# Patient Record
Sex: FEMALE | Race: WHITE
[De-identification: names, ages, dates, MRNs, and addresses within clinical notes are randomized per-mention and may not be internally consistent; named-entity substitution may affect disease eponyms.]

---

## 2017-10-03 NOTE — DIAGNOSTIC IMAGING REPORT
CT SCAN OF THE THORACIC SPINE



CLINICAL HISTORY: Chronic pain syndrome.



COMPARISON STUDY: No priors.



TECHNIQUE: CT scan of the thoracic spine is performed from the lower cervical

spine to the upper lumbar spine. Images are reviewed in the axial, sagittal, and

coronal planes. IV contrast was not administered for this examination. A dose

lowering technique was utilized adhering to the principles of ALARA.



CT DOSE: 496.69 mGycm



FINDINGS: The skeletal structures are osteopenic. Vertebral body height and

alignment are maintained throughout the thoracic spine. No lytic or blastic

lesion is seen. The transverse and spinous processes appear intact.

Postoperative change is partially visualized in the lower cervical region.

Intrathecal leads enter the central canal on the left at T1 to T2. These extend

in the cervical region. The leads are intact as visualized. The intervertebral

disc spaces are maintained. There is no evidence of large disc herniation or

central canal stenosis as seen by CT. There is no evidence of significant neural

foraminal stenosis. The paraspinous soft tissues are within normal limits. The

visualized posterior ribs appear intact. Emphysematous change is noted. No

airspace consolidation or pleural effusion is identified involving the partially

imaged lung parenchyma. Cortical scarring is noted in the upper pole of the left

kidney. There is nodularity of the left upper pole. A tiny hiatal hernia is

identified.



IMPRESSION:



1. No abnormality is seen involving the thoracic spine.



2. Postoperative change and intrathecal leads are noted in the lower cervical

region as detailed above.



3. Emphysema.



4. Cortical scarring is noted in the upper pole of the left kidney. There is

apparent nodularity involving the left upper pole which is likely related to

scarring. Correlation with renal ultrasound is recommended for further

interrogation.









Dictated:  10/3/2017 4:06 PM

Transcribed:  10/3/2017 5:17 PM

[facility]mini







Electronically signed by:  Boris Meyer M.D.

10/3/2017 5:26 PM



Dictated Date/Time:  10/3/2017 4:06 PM

## 2017-11-26 NOTE — EMERGENCY ROOM VISIT NOTE
History


Report prepared by Owen:  Janine Grady


Under the Supervision of:  Dr. Edmundo Manley M.D.


First contact with patient:  20:23


Chief Complaint:  INFECTION


Stated Complaint:  INFEC IN SURGERY ON BACK, INCISION SPLITTING OPEN





History of Present Illness


The patient is a 41 year old female who presents to the Emergency Room with 

complaints of a possible infection from her surgical site. The patient had a 

spinal pain stimulator surgically put in place by Dr. Garzon-Spinal Surgery 

at the [facility] on the 20th of October. The patient 

reports having 2 stitches pop out of the site and having it scab over. She had 

a follow up appointment with her surgeon on November 9th and was told 

everything was fine. The patient went to [place] a week ago and she showed the 

site to her daughter because it was the site was burning. At the time, her 

daughter thought the site was infected and put alcohol on it. The patient came 

home 3 days ago and has been putting peroxide and Neosporin on it since. She 

notes pain to the site but denies any fevers, chills, nausea or cough over the 

past week. Four days ago, the patient noticed the discharge and splitting of 

the site. The patient has chronic back pain.





   Source of History:  patient


   Position:  back


   Quality:  other (infection)


   Timing:  other (possible)


   Associated Symptoms:  + back pain, No fevers, No chills, No cough, No nausea





Review of Systems


See HPI for pertinent positives and negatives.  A total of ten systems were 

reviewed and were otherwise negative.





Past Medical & Surgical


Medical Problems:


(1) Brain cyst


(2) Chronic back pain








Family History





Patient reports no known family medical history.





Social History


Smoking Status:  Current Every Day Smoker


Marital Status:  


Housing Status:  lives with family


Occupation Status:  employed





Current/Historical Medications


Scheduled


Celecoxib (CeleBREX), 200 MG PO QPM


Clindamycin Hcl (Clindamycin Hcl), 4 CAP PO TID


Docusate Sodium (Colace), 1 CAP PO BID


Esomeprazole Magnesium (Nexium), 20 MG PO QPM


Gabapentin (Neurontin), 1 TAB PO TID


Metoprolol Succ (Toprol Xl) (Toprol-Xl), 25 MG PO QAM


Metoprolol Succ (Toprol Xl) (Toprol-Xl), 12.5 MG PO QPM


Morphine Cont Rel (Ms Contin), 15 MG PO Q12


Naproxen (Naprosyn), 500 MG PO BID


Saccharomyces Boulardii (Florastor), 1 CAP PO BID


Trazodone Hcl (Desyrel), 300 MG PO HS





Allergies


Coded Allergies:  


     No Known Allergies (Unverified , 12/2/16)





Physical Exam


Vital Signs











  Date Time  Temp Pulse Resp B/P (MAP) Pulse Ox O2 Delivery O2 Flow Rate FiO2


 


11/27/17 01:59  71 15 97/69 99   


 


11/27/17 01:56  65 15  99   


 


11/27/17 01:41  64 25  99   


 


11/27/17 01:36  61 18  98   


 


11/27/17 01:31    100/66    


 


11/27/17 01:21  58 13  99   


 


11/27/17 01:19    102/47    


 


11/27/17 00:36  67 19  99   


 


11/27/17 00:21  58 16  99   


 


11/27/17 00:06  63 17  99   


 


11/27/17 00:01    114/77    


 


11/26/17 23:51  63 25  98   


 


11/26/17 23:36  62 22  98   


 


11/26/17 23:31    106/69    


 


11/26/17 23:30  63 21  98   


 


11/26/17 23:15  64 17  97 Room Air  


 


11/26/17 23:14  64      


 


11/26/17 23:11    111/70    


 


11/26/17 23:05  68 20 111/78 99 Room Air  


 


11/26/17 21:45  67 20 100/76 99 Room Air  


 


11/26/17 20:07 36.9 79 18 143/99 99 Room Air  











Physical Exam


GENERAL: Awake, alert, well-appearing, in no distress


HENT: Normocephalic, atraumatic. Oropharynx unremarkable.


EYES: Normal conjunctiva. Sclera non-icteric.


NECK: Supple. No nuchal rigidity. FROM. No JVD.


RESPIRATORY: Clear to auscultation.


CARDIAC: Regular rate, normal rhythm. Extremities warm and well perfused. 

Pulses equal.


ABDOMEN: Soft, non-distended. No tenderness to palpation. No rebound or 

guarding. No masses.


RECTAL: Deferred.


BACK: 3.5 cm prior incision site for spinal surgical pain stimulator with 

dehisence with mild surrounding erythema, warm but no purulence or crepitus.


MUSCULOSKELETAL: Chest examination reveals no tenderness. The back is 

symmetrical on inspection without obvious abnormality. There is no CVA 

tenderness to palpation. No joint edema. 


LOWER EXTREMITIES: Calves are equal size bilaterally and non-tender. No edema. 

No discoloration. 


NEURO: Normal sensorium. No sensory or motor deficits noted. 


SKIN: No rash or jaundice noted.





Medical Decision & Procedures


ER Provider


Diagnostic Interpretation:


STATRAD: 


Preliminary Findings Only  See Final Report For Complete Findings 


CT ABDOMEN & PELVIS With Contrast:





Postsurgical changes associated with posterior implanted stimulators with mild 

subcutaneous stranding and air at midline adjacent to the leads and also more 

inferiorly at the lumbosacral junction.. Small 8 mm nodular density adjacent to 

the right-sided stimulator (image [id number]), unclear etiology. No evidence of 

drainable fluid collection.





Areas of wall thickening in the transverse, descending, and rectosigmoid colon, 

may be related to underdistention. Correlate clinically to exclude colitis.





Mild esophageal wall thickening.





No evidence of small bowel obstruction.





Likely chronic irregularity of the left kidney with renal pelviectasis. No 

evidence of hydroureter or ureteral stone.





Tiny fat-containing umbilical hernia.





Additional incidental findings.





Laboratory Results


11/26/17 20:45








Red Blood Count 4.83, Mean Corpuscular Volume 87.2, Mean Corpuscular Hemoglobin 

29.8, Mean Corpuscular Hemoglobin Concent 34.2, Mean Platelet Volume 10.5, 

Neutrophils (%) (Auto) 47.9, Lymphocytes (%) (Auto) 41.8, Monocytes (%) (Auto) 

6.4, Eosinophils (%) (Auto) 2.6, Basophils (%) (Auto) 1.0, Neutrophils # (Auto) 

5.57, Lymphocytes # (Auto) 4.86, Monocytes # (Auto) 0.75, Eosinophils # (Auto) 

0.30, Basophils # (Auto) 0.12





11/26/17 20:45

















Test


  11/26/17


20:45


 


White Blood Count


  11.63 K/uL


(4.8-10.8)


 


Red Blood Count


  4.83 M/uL


(4.2-5.4)


 


Hemoglobin


  14.4 g/dL


(12.0-16.0)


 


Hematocrit 42.1 % (37-47) 


 


Mean Corpuscular Volume


  87.2 fL


()


 


Mean Corpuscular Hemoglobin


  29.8 pg


(25-34)


 


Mean Corpuscular Hemoglobin


Concent 34.2 g/dl


(32-36)


 


Platelet Count


  338 K/uL


(130-400)


 


Mean Platelet Volume


  10.5 fL


(7.4-10.4)


 


Neutrophils (%) (Auto) 47.9 % 


 


Lymphocytes (%) (Auto) 41.8 % 


 


Monocytes (%) (Auto) 6.4 % 


 


Eosinophils (%) (Auto) 2.6 % 


 


Basophils (%) (Auto) 1.0 % 


 


Neutrophils # (Auto)


  5.57 K/uL


(1.4-6.5)


 


Lymphocytes # (Auto)


  4.86 K/uL


(1.2-3.4)


 


Monocytes # (Auto)


  0.75 K/uL


(0.11-0.59)


 


Eosinophils # (Auto)


  0.30 K/uL


(0-0.5)


 


Basophils # (Auto)


  0.12 K/uL


(0-0.2)


 


RDW Standard Deviation


  45.7 fL


(36.4-46.3)


 


RDW Coefficient of Variation


  14.3 %


(11.5-14.5)


 


Immature Granulocyte % (Auto) 0.3 % 


 


Immature Granulocyte # (Auto)


  0.03 K/uL


(0.00-0.02)


 


Anion Gap


  10.0 mmol/L


(3-11)


 


Est Creatinine Clear Calc


Drug Dose 62.2 ml/min 


 


 


Estimated GFR (


American) 85.0 


 


 


Estimated GFR (Non-


American 73.4 


 


 


BUN/Creatinine Ratio 12.3 (10-20) 


 


Calcium Level


  8.9 mg/dl


(8.5-10.1)


 


Total Bilirubin


  0.3 mg/dl


(0.2-1)


 


Direct Bilirubin


  0.1 mg/dl


(0-0.2)


 


Aspartate Amino Transf


(AST/SGOT) 30 U/L (15-37) 


 


 


Alanine Aminotransferase


(ALT/SGPT) 32 U/L (12-78) 


 


 


Alkaline Phosphatase


  70 U/L


()


 


Total Protein


  7.8 gm/dl


(6.4-8.2)


 


Albumin


  3.8 gm/dl


(3.4-5.0)


 


Lipase


  458 U/L


()





Laboratory results reviewed by me





Medications Administered











 Medications


  (Trade)  Dose


 Ordered  Sig/Tutu


 Route  Start Time


 Stop Time Status Last Admin


Dose Admin


 


 Sodium Chloride  1,000 ml @ 


 999 mls/hr  Q1H1M STAT


 IV  11/26/17 20:44


 11/26/17 21:44 DC 11/26/17 21:00


999 MLS/HR


 


 Morphine Sulfate


  (Oramorph Sr Tab)  15 mg  NOW  STAT


 PO  11/26/17 21:57


 11/26/17 21:59 DC 11/26/17 22:24


15 MG


 


 Nicotine


  (Nicoderm Cq


 21MG Patch)  1 patch  NOW  STAT


 TD  11/27/17 00:33


 11/27/17 00:34 DC 11/27/17 00:45


1 PATCH


 


 Clindamycin


 Phosphate 600 mg/


 Dextrose  54 ml @ 


 100 mls/hr  ONE  ONCE


 IV  11/27/17 01:15


 11/27/17 01:47 DC 11/27/17 01:19


100 MLS/HR


 


 Clindamycin HCl


  (Cleocin Cap)  600 mg  NOW  ONCE


 PO  11/27/17 01:30


 11/27/17 01:31 DC 11/27/17 01:35


600 MG











ED Course


2026: The patient was evaluated in room B10. A complete history and physical 

exam was performed.





Medical Decision


I reviewed the patient's past medical history, medications, and the nursing 

notes as described above. Differential diagnoses: cellulitis, deep tissue 

infection.





The patient is a 41-year-old woman with a past medical history of chronic pain 

and spine stimulator placed in beginning of October at [facility] Dr. Garzon, and 

surgeon presents with concern for infection at the placement of the battery 

pack where it appears the wound has dehisced and she had purulent drainage for 

the past several days per history of present illness.  Arrival the patient is 

well-appearing, in no distress, afebrile with stable vital signs.  Battery pack 

incision site does appearing dehisced with mild surrounding erythema and warmth 

with mild tenderness, no active purulent drainage.


WBC marginally elevated 11.65.  Labs otherwise unremarkable.  STATRAD read of 

CT scan demonstrates some stranding along the leads that are nonspecific and 

could be postoperative versus infection.  Patient's surgeon was paged however 

we did not receive a call back.  Given the patient appears clinically well, 

will give the patient an IV dose of antibiotics with subsequent close follow-up 

with her surgeon first thing tomorrow.  She is agreeable with this plan.  

Patient did not want to wait for long IV infusion of vancomycin therefore will 

with clindamycin for MRSA coverage and then DC with oral Rx. Findings and plan 

for follow-up reviewed with patient. Patient agreeable and d/c'd per discharge 

instructions.





Medication Reconcilliation


Current Medication List:  was personally reviewed by me





Impression





 Primary Impression:  


 Cellulitis


 Additional Impression:  


 Wound dehiscence





Scribe Attestation


The scribe's documentation has been prepared under my direction and personally 

reviewed by me in its entirety. I confirm that the note above accurately 

reflects all work, treatment, procedures, and medical decision making performed 

by me.





Departure Information


Dispostion


Home / Self-Care





Prescriptions





Saccharomyces Boulardii (Florastor) 250 Mg Cap


1 CAP PO BID for 10 Days, #20 CAP


   Prov: Edmundo Manley M.D.         11/27/17 


Clindamycin Hcl (CLINDAMYCIN HCL) 150 Mg Cap


4 CAP PO TID for 10 Days, #120 CAP


   Prov: Edmundo Manley M.D.         11/27/17





Referrals


No Doctor, Assigned (PCP)





Patient Instructions


ED Infec Skin Cellulitis, [facility]





Additional Instructions





Please follow up with your spine surgeon, Dr. Garzon, at [facility], tomorrow for re

-evaluation.





You likely have a skin infection associated with your recent stimulator 

placement and should be promptly evaluated by your surgeon tomorrow.


Otherwise, your exam, CT scan, and lab results did not show signs of an 

emergent condition at this time.





Clindamycin as directed.


Probiotic to help prevent antibiotic associated diarrhea.





Return to the emergency department for worsening symptoms as described in the 

accompanying instructions.





Problem Qualifiers

## 2017-11-27 NOTE — DIAGNOSTIC IMAGING REPORT
ABD/PELVIS IV CONTRAST ONLY



CT DOSE: 271.56 mGy.cm



HISTORY: Pain  spine stimulator infection / r/o deep infection.



TECHNIQUE: Multiaxial CT images of the abdomen and pelvis were performed

following the use of intravenous contrast.  A dose lowering technique was

utilized adhering to the principles of ALARA.





COMPARISON STUDY: None.



FINDINGS: Lung bases are clear. Liver spleen and pancreas are unremarkable.

Cortical scarring of both kidneys is present. No evidence for hydronephrosis

present. Extrarenal pelvis is noted on the left.



Postoperative changes consistent with recent bile stimulator placement are

present of the thoracolumbar spine. HISTORY: Is matter fat stranding surrounding

the leads as well as a trace postoperative air. No evidence for abnormal

collection is identified. There is no evidence for drainable abscess.



Bowel pattern is nonobstructive. Appendix is normal. There is no evidence for

intra-abdominal or intrapelvic drainable abscess or collection.



IMPRESSION: 

Bilateral spinal bile stimulators with expected postoperative soft tissue

change. No evidence for abscess or collection. No acute process of the abdomen

or pelvis. 







The above report was generated using voice recognition software.  It may contain

grammatical, syntax or spelling errors.







Electronically signed by:  Zachary Singh M.D.

11/27/2017 6:17 AM



Dictated Date/Time:  11/27/2017 6:14 AM

## 2018-08-13 ENCOUNTER — HOSPITAL ENCOUNTER (EMERGENCY)
Dept: HOSPITAL 45 - C.EDB | Age: 43
Discharge: HOME | End: 2018-08-13
Payer: OTHER GOVERNMENT

## 2018-08-13 VITALS — OXYGEN SATURATION: 98 % | SYSTOLIC BLOOD PRESSURE: 99 MMHG | DIASTOLIC BLOOD PRESSURE: 62 MMHG | HEART RATE: 60 BPM

## 2018-08-13 VITALS — OXYGEN SATURATION: 99 %

## 2018-08-13 VITALS
HEIGHT: 65 IN | BODY MASS INDEX: 22.08 KG/M2 | BODY MASS INDEX: 22.08 KG/M2 | WEIGHT: 132.5 LBS | WEIGHT: 132.5 LBS | HEIGHT: 65 IN

## 2018-08-13 VITALS — TEMPERATURE: 98.06 F

## 2018-08-13 DIAGNOSIS — B37.3: ICD-10-CM

## 2018-08-13 DIAGNOSIS — K20.9: Primary | ICD-10-CM

## 2018-08-13 DIAGNOSIS — R11.0: ICD-10-CM

## 2018-08-13 DIAGNOSIS — G89.29: ICD-10-CM

## 2018-08-13 DIAGNOSIS — Z79.899: ICD-10-CM

## 2018-08-13 DIAGNOSIS — F17.200: ICD-10-CM

## 2018-08-13 LAB
ALBUMIN SERPL-MCNC: 4.7 GM/DL (ref 3.4–5)
ALP SERPL-CCNC: 53 U/L (ref 45–117)
ALT SERPL-CCNC: 37 U/L (ref 12–78)
AST SERPL-CCNC: 33 U/L (ref 15–37)
BASOPHILS # BLD: 0.16 K/UL (ref 0–0.2)
BASOPHILS NFR BLD: 1.9 %
BUN SERPL-MCNC: 11 MG/DL (ref 7–18)
CALCIUM SERPL-MCNC: 9.6 MG/DL (ref 8.5–10.1)
CO2 SERPL-SCNC: 25 MMOL/L (ref 21–32)
CREAT SERPL-MCNC: 1.02 MG/DL (ref 0.6–1.2)
EOS ABS #: 0.08 K/UL (ref 0–0.5)
EOSINOPHIL NFR BLD AUTO: 353 K/UL (ref 130–400)
GLUCOSE SERPL-MCNC: 92 MG/DL (ref 70–99)
HCT VFR BLD CALC: 45.7 % (ref 37–47)
HGB BLD-MCNC: 15.9 G/DL (ref 12–16)
IG#: 0.01 K/UL (ref 0–0.02)
IMM GRANULOCYTES NFR BLD AUTO: 26.1 %
INR PPP: 1.1 (ref 0.9–1.1)
LIPASE: 589 U/L (ref 73–393)
LYMPHOCYTES # BLD: 2.2 K/UL (ref 1.2–3.4)
MCH RBC QN AUTO: 29.8 PG (ref 25–34)
MCHC RBC AUTO-ENTMCNC: 34.8 G/DL (ref 32–36)
MCV RBC AUTO: 85.6 FL (ref 80–100)
MONO ABS #: 0.35 K/UL (ref 0.11–0.59)
MONOCYTES NFR BLD: 4.1 %
NEUT ABS #: 5.64 K/UL (ref 1.4–6.5)
NEUTROPHILS # BLD AUTO: 0.9 %
NEUTROPHILS NFR BLD AUTO: 66.9 %
PMV BLD AUTO: 11 FL (ref 7.4–10.4)
POTASSIUM SERPL-SCNC: 4 MMOL/L (ref 3.5–5.1)
PROT SERPL-MCNC: 8.9 GM/DL (ref 6.4–8.2)
PTT PATIENT: 27.9 SECONDS (ref 21–31)
RED CELL DISTRIBUTION WIDTH CV: 12.9 % (ref 11.5–14.5)
RED CELL DISTRIBUTION WIDTH SD: 40.5 FL (ref 36.4–46.3)
SODIUM SERPL-SCNC: 136 MMOL/L (ref 136–145)
WBC # BLD AUTO: 8.44 K/UL (ref 4.8–10.8)

## 2018-08-13 NOTE — EMERGENCY ROOM VISIT NOTE
History


First contact with patient:  12:27


Chief Complaint:  ABDOMINAL PAIN


Stated Complaint:  BLEEDING STOMACH ULCERS


Nursing Triage Summary:  


Pt reports hx of stomach ulcers and reports having 4 episodes of emesis with 


small dark blood clots. Pt states that stomach ulcers have been flaring up for 


the past 3 months due to stress, and that her pcp has prescribed her nexium and 


to increase the dose as sx have gotten worse. Pt states that she is currently 


taking nexium three times a day. Pt states that her pcp told her to come to ED 


due to bloody emesis.





History of Present Illness


The patient is a 42 year old female who presents to the Emergency Room via 

private vehicle with complaints of "bleeding stomach ulcers".  The patient 

states that she has a history of bleeding ulcers, and notes that she had an EGD 

years ago which showed 12 ulcers in the stomach and numerous small ulcers.  She 

believes this was about 7-10 years ago.  She states that she has been doing 

okay but has been on Nexium 3 times a day and Zantac 3 times a day over the 

past month with minimal relief.  She notes that she has heartburn/burning after 

she eats, and for the past 4 days has had epigastric abdominal pain and has 

been vomiting which she believes is some dark red blood.  She notes this 

happened 4 times.  She denies any black tarry stools.  She denies having 

surgery on the ulcers.  She also notes that despite having a hysterectomy, she 

is concerned that the vaginal vault was not closed years ago, and that it is 

open into her abdomen and also notes vaginal discharge.  She rates her overall 

discomfort as an 8/10.  She notes a monogamous relationship, no concern over 

sexual transmitted infection or disease.  Never had discharge like this before.





Review of Systems


A complete 10-point Review of Systems was discussed with the patient, with 

pertinent positives and negatives listed in the History of Present Illness. All 

remaining Review of Systems questions can be considered negative unless 

otherwise specified.





Past Medical/Surgical History


Medical Problems:


(1) Brain cyst


(2) Chronic back pain








Family History





Patient reports no known family medical history.





Social History


Smoking Status:  Current Every Day Smoker


Marital Status:  


Housing Status:  lives with family





Current/Historical Medications


Scheduled


Docusate Sodium (Colace), 1 CAP PO BID


Esomeprazole Magnesium (Nexium), 40 MG PO DAILY


Fluconazole (Diflucan), 150 MG PO DAILY


Gabapentin (Neurontin), 300 MG PO TID


Metoprolol Succ (Toprol Xl) (Toprol-Xl), 25 MG PO BID


Morphine Cont Rel (Ms Contin), 15 MG PO Q12


Naproxen (Naprosyn), 500 MG PO BID


Sucralfate (Carafate), 1 TAB PO QID


Trazodone Hcl (Desyrel), 300 MG PO HS





Scheduled PRN


Oxycodone Ir (Roxicodone Ir), 1 TAB PO Q6 PRN for Pain





Physical Exam


Vital Signs











  Date Time  Temp Pulse Resp B/P (MAP) Pulse Ox O2 Delivery O2 Flow Rate FiO2


 


8/13/18 18:10  60 18 99/62 98   


 


8/13/18 17:00  59 18  98 Room Air  


 


8/13/18 15:42  63 18 132/72 96 Room Air  


 


8/13/18 14:31  55 18 113/56 99 Room Air  


 


8/13/18 14:31     99 Room Air  


 


8/13/18 11:53 36.7 70 18 116/76 99 Room Air  











Physical Exam


VITAL SIGNS - Vital signs and nursing notes were reviewed.  Stable.  Afebrile.


GENERAL -42-year-old female appearing her stated age who is in no acute 

distress. Communicates well with provider and answers questions appropriately.


SKIN - Without rashes.  No meningeal or petechial rash.


HEAD - NC/AT.


EYES - PERRL with EOMI bilaterally. Sclera anicteric. 


EARS - No deformities of external structures noted on gross examination 

bilaterally.


NOSE - Midline and without cyanosis. No epistaxis or purulent drainage noted. 


MOUTH/OROPHARYNX - Without perioral cyanosis. 


LUNGS - Chest wall symmetric without accessory muscle use, intercostals 

retractions, or central cyanosis. Normal vesicular breath sounds CTA B/L. No 

wheezes, rales, or rhonchi appreciated.


CARDIAC - RRR with S1/S2. No murmur, rubs, or gallops appreciated. 


ABDOMEN - Abdominal contour normal without pulsations or visible masses. BS 

normoactive all four quadrants.  Epigastric abdominal tenderness noted.  No 

palpable masses, hepatosplenomegaly, or ascites noted.


EXTREMITIES - No clubbing or peripheral cyanosis. No pretibial edema present.  +

5/5 strength noted in UE/LE bilaterally.


NEUROLOGIC - Cranial nerves II through XII grossly intact. Sensory intact to 

light touch throughout. 


PSYCH - A&Ox3 and cooperates fully with examiner. Pt is very pleasant and 

interacts well with examiner.


RECTAL exam: Female chaperone present.  External hemorrhoids noted.  No 

internal masses.  Negative bright red blood per rectum.  Negative Hemoccult.





PELVIC EXAM: 


The patient's nurse was present to assist with exam, and chaperone.  The 

patient was educated upon what her pelvic exam was, and she was offered to 

decline.  Patient did not decline.  I explained to her the pelvic exam.  The 

patient was prepared and positioned for best examination.  Patient was 

positioned by nurse Brigida who was present for rectal exam and pelvic exam.  The 

external genitawithin normal limits.  The speculum was held then a 45 angle 

and properly lubricated, the speculum was inserted without difficulty to the 

depth of where the cervix should be.  Speculum was then opened slowly.  Cervix 

was not identified as this was likely surgically removed.  The vaginal vault is 

closed.  Upon internal inspection there does appear to be white, cottage 

cheeselike discharge.  At this time 3 samples were taken of the  discharge.  

These were cultured.  The speculum was then closed and removed without 

difficulty.  I then explained to the patient that I was in a perform a bimanual 

pelvic examination.  I then introduced the index finger into the vaginal vault, 

and no abnormalities were noted.   The exam was concluded, the nurse felt the 

patient back to her bed.  I suspect vaginal yeast infection.





Medical Decision & Procedures


ER Provider


Diagnostic Interpretation:


CT OF THE ABDOMEN AND PELVIS WITH CONTRAST





CLINICAL HISTORY: Epigastric abd pain, hematemesis.    





COMPARISON STUDY:  CT of the abdomen and pelvis November 26, 2017.





TECHNIQUE: Following IV administration of 92 mL of Optiray-320, axial images of


the abdomen and pelvis were obtained from the lung bases to the proximal femurs.


Images were reviewed in the axial, sagittal, and coronal planes. IV contrast was


administered without complication.  A dose lowering technique was utilized


adhering to the principles of ALARA. Oral contrast was administered.








CT DOSE: 275.00 mGy.cm





FINDINGS: Note is made of mild circumferential wall thickening of the distal


esophagus. Lung bases are clear. There is no biliary ductal dilatation status


post cholecystectomy. There is no peripancreatic infiltration. The liver,


spleen, adrenal glands and right kidney are unremarkable. There is multifocal


scarring and atrophy of the left kidney. Prominence of the left renal pelvis is


unchanged. Moderate aortoiliac atherosclerotic plaque is noted. There is no


aneurysmal dilatation. Caliber and wall thickness of small and large bowel are


normal. Appendix is not visualized. There is no right lower quadrant


inflammation. No pneumatosis, free air or portal venous gas is present.


Intracanalicular electrodes are noted.











IMPRESSION:  





1. Mild circumferential wall thickening of the distal esophagus. This may be due


to underdistention or represent esophagitis.





2. No bowel obstruction. No bowel wall thickening.





3. Multifocal left renal scarring.





4. No acute process within the abdomen or pelvis.











Electronically signed by:  Fabio Crawford M.D.


8/13/2018 3:32 PM





Dictated Date/Time:  8/13/2018 3:25 PM





Laboratory Results


8/13/18 12:15








Red Blood Count 5.34, Mean Corpuscular Volume 85.6, Mean Corpuscular Hemoglobin 

29.8, Mean Corpuscular Hemoglobin Concent 34.8, Mean Platelet Volume 11.0, 

Neutrophils (%) (Auto) 66.9, Lymphocytes (%) (Auto) 26.1, Monocytes (%) (Auto) 

4.1, Eosinophils (%) (Auto) 0.9, Basophils (%) (Auto) 1.9, Neutrophils # (Auto) 

5.64, Lymphocytes # (Auto) 2.20, Monocytes # (Auto) 0.35, Eosinophils # (Auto) 

0.08, Basophils # (Auto) 0.16





8/13/18 12:15








8/13/18 14:07

















Test


  8/13/18


12:15 8/13/18


13:00 8/13/18


14:07 8/13/18


16:30


 


White Blood Count


  8.44 K/uL


(4.8-10.8) 


  


  


 


 


Red Blood Count


  5.34 M/uL


(4.2-5.4) 


  


  


 


 


Hemoglobin


  15.9 g/dL


(12.0-16.0) 


  


  


 


 


Hematocrit 45.7 % (37-47)    


 


Mean Corpuscular Volume


  85.6 fL


() 


  


  


 


 


Mean Corpuscular Hemoglobin


  29.8 pg


(25-34) 


  


  


 


 


Mean Corpuscular Hemoglobin


Concent 34.8 g/dl


(32-36) 


  


  


 


 


Platelet Count


  353 K/uL


(130-400) 


  


  


 


 


Mean Platelet Volume


  11.0 fL


(7.4-10.4) 


  


  


 


 


Neutrophils (%) (Auto) 66.9 %    


 


Lymphocytes (%) (Auto) 26.1 %    


 


Monocytes (%) (Auto) 4.1 %    


 


Eosinophils (%) (Auto) 0.9 %    


 


Basophils (%) (Auto) 1.9 %    


 


Neutrophils # (Auto)


  5.64 K/uL


(1.4-6.5) 


  


  


 


 


Lymphocytes # (Auto)


  2.20 K/uL


(1.2-3.4) 


  


  


 


 


Monocytes # (Auto)


  0.35 K/uL


(0.11-0.59) 


  


  


 


 


Eosinophils # (Auto)


  0.08 K/uL


(0-0.5) 


  


  


 


 


Basophils # (Auto)


  0.16 K/uL


(0-0.2) 


  


  


 


 


RDW Standard Deviation


  40.5 fL


(36.4-46.3) 


  


  


 


 


RDW Coefficient of Variation


  12.9 %


(11.5-14.5) 


  


  


 


 


Immature Granulocyte % (Auto) 0.1 %    


 


Immature Granulocyte # (Auto)


  0.01 K/uL


(0.00-0.02) 


  


  


 


 


Prothrombin Time


  11.3 SECONDS


(9.0-12.0) 


  


  


 


 


Prothromb Time International


Ratio 1.1 (0.9-1.1) 


  


  


  


 


 


Activated Partial


Thromboplast Time 27.9 SECONDS


(21.0-31.0) 


  


  


 


 


Partial Thromboplastin Ratio 1.1    


 


Anion Gap


  7.0 mmol/L


(3-11) 


  


  


 


 


Est Creatinine Clear Calc


Drug Dose 63.3 ml/min 


  


  


  


 


 


Estimated GFR (


American) 77.5 


  


  


  


 


 


Estimated GFR (Non-


American 66.8 


  


  


  


 


 


BUN/Creatinine Ratio 10.9 (10-20)    


 


Calcium Level


  9.6 mg/dl


(8.5-10.1) 


  


  


 


 


Total Bilirubin


  1.1 mg/dl


(0.2-1) 


  


  


 


 


Alanine Aminotransferase


(ALT/SGPT) 37 U/L (12-78) 


  


  


  


 


 


Alkaline Phosphatase


  53 U/L


() 


  


  


 


 


Total Protein


  8.9 gm/dl


(6.4-8.2) 


  


  


 


 


Albumin


  4.7 gm/dl


(3.4-5.0) 


  


  


 


 


Globulin


  4.2 gm/dl


(2.5-4.0) 


  


  


 


 


Albumin/Globulin Ratio 1.1 (0.9-2)    


 


Lipase


  589 U/L


() 


  


  


 


 


Urine Color  DK YELLOW   


 


Urine Appearance  CLOUDY (CLEAR)   


 


Urine pH  5.0 (4.5-7.5)   


 


Urine Specific Gravity


  


  1.024


(1.000-1.030) 


  


 


 


Urine Protein  NEG (NEG)   


 


Urine Glucose (UA)  NEG (NEG)   


 


Urine Ketones  TRACE (NEG)   


 


Urine Occult Blood  NEG (NEG)   


 


Urine Nitrite  NEG (NEG)   


 


Urine Bilirubin  NEG (NEG)   


 


Urine Urobilinogen  NEG (NEG)   


 


Urine Leukocyte Esterase  TRACE (NEG)   


 


Urine WBC (Auto)


  


  5-10 /hpf


(0-5) 


  


 


 


Urine RBC (Auto)  0-4 /hpf (0-4)   


 


Urine Hyaline Casts (Auto)   /lpf (0-5)   


 


Urine Epithelial Cells (Auto)  >30 /lpf (0-5)   


 


Urine Bacteria (Auto)  1+ (NEG)   


 


Urine Pathogenic Casts   /lpf (0)   


 


Urine Yeast (Auto)


  


  BUDDING (NONE


PRSENT) 


  


 


 


Magnesium Level


  


  


  1.9 mg/dl


(1.8-2.4) 


 


 


Aspartate Amino Transf


(AST/SGOT) 


  


  33 U/L (15-37) 


  


 














 Date/Time


Source Procedure


Growth Status


 


 


 8/13/18 16:30


Vaginal Swab Trichomonas Preparation - Final Complete











Medications Administered











 Medications


  (Trade)  Dose


 Ordered  Sig/Tutu


 Route  Start Time


 Stop Time Status Last Admin


Dose Admin


 


 Morphine Sulfate


  (MoRPHine


 SULFATE INJ)  4 mg  NOW  STAT


 IV  8/13/18 12:38


 8/13/18 12:41 DC 8/13/18 12:59


4 MG


 


 Ondansetron HCl


  (Zofran Inj)  4 mg  NOW  STAT


 IV  8/13/18 12:38


 8/13/18 12:41 DC 8/13/18 12:59


4 MG


 


 Sodium Chloride  500 ml @ 


 500 mls/hr  Q1H STAT


 IV  8/13/18 12:38


 8/13/18 13:37 DC 8/13/18 12:59


500 MLS/HR


 


 Fluconazole


  (Diflucan Tab)  150 mg  QAM  STAT


 PO  8/13/18 17:36


 8/13/18 17:38 DC 8/13/18 18:06


150 MG











Medical Decision


Patient was seen and evaluated as above in room B9. Review was performed of 

nursing notes and vital signs. After obtaining a thorough history and physical 

examination the above work up was performed.  She presents to us today with 2 

complaints essentially.  The first is epigastric pain with 4 episodes of 

hematemesis over the past few days, as well as concerned that her vaginal vault 

may never been closed from the surgery she had performed years ago for her 

hysterectomy and also discharge.  I reassured her through pelvic examination 

that she consented to, with female chaperone, that it was closed, and the 

discharge is likely a vaginal yeast infection.  For this she will be given 

Diflucan.  In regard to the epigastric abdominal pain I suspect this is likely 

due to reflux/esophagitis.  There was no episodes of vomiting while here.  

Hemoccult negative for blood.  I do not suspect bleeding esophageal varices or 

other emergent process.  She was given pain medication here.  This included 

morphine, and Zofran for nausea.  She was given half a liter of fluid.  CBC 

reveals no concerning leukocytosis or anemia.  Coags normal.  Metabolic panel 

reveals no evidence of kidney or liver failure.  Total bilirubin is up slightly 

at 1.1.  Patient's lipase slightly elevated at 589.  She notes a history of 

pancreatic infections.  At this time I do not believe that her pain is from 

pancreatitis, rather I believe this is likely esophagitis.  Urinalysis reveals 

trace ketones trace leukocytes, some white blood cells, but lots of skin cells.

  I suspect this is likely contaminated sample.  There is also yeast in the 

urine, I suspect likely contaminant from the vaginal discharge.  Additional 

vaginal swabs and culture pending.  I do not believe that sexually transmitted 

infection treatment should be empirically initiated as this is likely a yeast 

infection.  Her vital signs here are stable.  Decision was made to obtain a CT 

scan.  This was with both IV and oral contrast.  Esophagitis noted.  I 

discussed this with the attending physician and subsequently the on-call 

gastroenterologist, Dr. Frausto.  We discussed the case.  He recommended 

adding Carafate and his office will call the patient within the next day or so 

regarding a follow-up for an EGD.  I believe this is an excellent course of 

action and the patient was very happy with this.  She notes that she did run 

out of her chronic pain medication I informed her that I could not refill that 

however will give a short prescription of oxycodone for her pain.  She was 

educated upon concern over naproxen as this could worsen her symptoms.  I 

believe that the small amount of blood she had in her vomit which was not seen 

here but prior to coming here was likely from the esophagitis and not from an 

esophageal variceal bleeding gastric ulcer.  Regardless, EGD will be 

definitive.  Furthermore, patient will be given another dose of Diflucan at 

home in the event that this first dose here does not work she is to take us in 

7 days.  She is to follow with the family doctor.  Incidentals discussed with 

her regarding CT scan and her findings here today.  She is to follow with the 

family doctor. The patient was educated upon management, educated upon todays 

findings/results, educated upon symptoms in which to return, had questions 

answered prior to discharge, and was discharged home in good condition.  No red 

flags in the [place] drug monitoring system.





Case was discussed with the attending physician.








In the evaluation and treatment of this patient the following differential 

diagnoses were entertained: Esophagitis, pericarditis, MI, PE, vaginal infection

, among others





Impression





 Primary Impression:  


 Esophagitis


 Additional Impression:  


 Vaginal yeast infection





Departure Information


Dispostion


Home / Self-Care





Condition


GOOD





Prescriptions





Oxycodone Ir (Roxicodone Ir) 5 Mg Tab


1 TAB PO Q6 Y for Pain, #12 TAB


   For Initial Treatment


   Prov: Stanislaw Mcgregor PA-C         8/13/18 


Sucralfate (CARAFATE) 1 Gm Tab


1 TAB PO QID for 14 Days, #56 TAB 1 Refill


   Prov: Stanislaw Mcgregor PA-C         8/13/18 


Fluconazole (DIFLUCAN) 150 Mg Tab


150 MG PO DAILY for 1 Day, #1 TAB


   Prov: Stanislaw Mcgregor PA-C         8/13/18





Referrals


No Doctor, Assigned (PCP)








Marc Frausto M.D.





Patient Instructions


My [facility]





Additional Instructions





You have been treated in the Emergency Department your Abdominal Pain. 





Please expect a phone call from [facility] regarding your esophagitis they 

will likely want to schedule an EGD for this week.  If they do not call please 

call here at [phone] and ask for a  .





You most likely have esophagitis which is irritation of the esophagus.





The GI specialist recommends Carafate, 1 g tablet every 6 hours for 2 weeks.





Continue regular scheduled medications.





Consume a bland diet free of spicy foods for the next few days.





Rest and stay well-hydrated.





For the vaginal yeast infection recommend Diflucan 1 tablet here.  If this does 

not resolve in 7 days please take another tablet.  Please also consider over-the

-counter Monistat or similar medications.





You have been prescribed oxycodone immediate release to be used for pain 

control. This is a narcotic medication. You cannot drive or consume alcohol 

while on this medicine. This medicine should only be used for pain that cannot 

be controlled with over-the-counter pain medicines.





Please no NSAIDs such as ibuprofen, naproxen, Aleve or similar medications 

until you follow-up with GI.





Drink plenty of water and stay well hydrated. 





As with any trip to the Emergency Department, you should follow-up with your 

Primary Care Provider from today's visit.  For your visit here today as well as 

your CT scan and lab work.





Return to the emergency department if your symptoms persist despite treatment 

plan outlined above or if the following symptoms occur: increased fevers, chills

, worsening nausea/vomiting, blood in your stool or urine.





Problem Qualifiers

## 2018-08-13 NOTE — DIAGNOSTIC IMAGING REPORT
CT OF THE ABDOMEN AND PELVIS WITH CONTRAST



CLINICAL HISTORY: Epigastric abd pain, hematemesis.    



COMPARISON STUDY:  CT of the abdomen and pelvis November 26, 2017.



TECHNIQUE: Following IV administration of 92 mL of Optiray-320, axial images of

the abdomen and pelvis were obtained from the lung bases to the proximal femurs.

Images were reviewed in the axial, sagittal, and coronal planes. IV contrast was

administered without complication.  A dose lowering technique was utilized

adhering to the principles of ALARA. Oral contrast was administered.





CT DOSE: 275.00 mGy.cm



FINDINGS: Note is made of mild circumferential wall thickening of the distal

esophagus. Lung bases are clear. There is no biliary ductal dilatation status

post cholecystectomy. There is no peripancreatic infiltration. The liver,

spleen, adrenal glands and right kidney are unremarkable. There is multifocal

scarring and atrophy of the left kidney. Prominence of the left renal pelvis is

unchanged. Moderate aortoiliac atherosclerotic plaque is noted. There is no

aneurysmal dilatation. Caliber and wall thickness of small and large bowel are

normal. Appendix is not visualized. There is no right lower quadrant

inflammation. No pneumatosis, free air or portal venous gas is present.

Intracanalicular electrodes are noted.







IMPRESSION:  



1. Mild circumferential wall thickening of the distal esophagus. This may be due

to underdistention or represent esophagitis.



2. No bowel obstruction. No bowel wall thickening.



3. Multifocal left renal scarring.



4. No acute process within the abdomen or pelvis.







Electronically signed by:  Fabio Crawford M.D.

8/13/2018 3:32 PM



Dictated Date/Time:  8/13/2018 3:25 PM

## 2018-08-16 NOTE — PHARMACY PROGRESS NOTE
ED Pharmacist Culture FollowUp


Date of Service:


Aug 16, 2018.





Called patient regarding genital culture, growing gardnerella vaginalis in 

addition to yeast not candida albicans. Patient was treated with fluconazole in 

ED and given script for follow up dose which should cover yeast. Prescription 

for metronidzole 500 mg BID x 7 days called to [facility] [place] at the 

patient's request.  Case discussed with Dr. Greene, who is the prescribing 

provider.